# Patient Record
Sex: MALE | Race: WHITE | NOT HISPANIC OR LATINO | URBAN - METROPOLITAN AREA
[De-identification: names, ages, dates, MRNs, and addresses within clinical notes are randomized per-mention and may not be internally consistent; named-entity substitution may affect disease eponyms.]

---

## 2018-05-25 ENCOUNTER — OUTPATIENT (OUTPATIENT)
Dept: OUTPATIENT SERVICES | Facility: HOSPITAL | Age: 73
LOS: 1 days | Discharge: HOME | End: 2018-05-25

## 2018-05-29 DIAGNOSIS — R89.7 ABNORMAL HISTOLOGICAL FINDINGS IN SPECIMENS FROM OTHER ORGANS, SYSTEMS AND TISSUES: ICD-10-CM

## 2021-01-24 ENCOUNTER — INPATIENT (INPATIENT)
Facility: HOSPITAL | Age: 76
LOS: 0 days | Discharge: HOME | End: 2021-01-25
Attending: INTERNAL MEDICINE | Admitting: INTERNAL MEDICINE
Payer: MEDICARE

## 2021-01-24 ENCOUNTER — EMERGENCY (EMERGENCY)
Facility: HOSPITAL | Age: 76
LOS: 0 days | Discharge: HOME | End: 2021-01-24
Admitting: INTERNAL MEDICINE

## 2021-01-24 VITALS
SYSTOLIC BLOOD PRESSURE: 139 MMHG | DIASTOLIC BLOOD PRESSURE: 75 MMHG | HEART RATE: 140 BPM | RESPIRATION RATE: 18 BRPM | OXYGEN SATURATION: 100 % | TEMPERATURE: 99 F

## 2021-01-24 DIAGNOSIS — R55 SYNCOPE AND COLLAPSE: ICD-10-CM

## 2021-01-24 DIAGNOSIS — E78.5 HYPERLIPIDEMIA, UNSPECIFIED: ICD-10-CM

## 2021-01-24 DIAGNOSIS — I10 ESSENTIAL (PRIMARY) HYPERTENSION: ICD-10-CM

## 2021-01-24 DIAGNOSIS — R09.02 HYPOXEMIA: ICD-10-CM

## 2021-01-24 LAB
ALBUMIN SERPL ELPH-MCNC: 4.4 G/DL — SIGNIFICANT CHANGE UP (ref 3.5–5.2)
ALP SERPL-CCNC: 112 U/L — SIGNIFICANT CHANGE UP (ref 30–115)
ALT FLD-CCNC: 13 U/L — SIGNIFICANT CHANGE UP (ref 0–41)
ANION GAP SERPL CALC-SCNC: 9 MMOL/L — SIGNIFICANT CHANGE UP (ref 7–14)
APTT BLD: 29.5 SEC — SIGNIFICANT CHANGE UP (ref 27–39.2)
AST SERPL-CCNC: 19 U/L — SIGNIFICANT CHANGE UP (ref 0–41)
BASOPHILS # BLD AUTO: 0.09 K/UL — SIGNIFICANT CHANGE UP (ref 0–0.2)
BASOPHILS NFR BLD AUTO: 1.1 % — HIGH (ref 0–1)
BILIRUB SERPL-MCNC: 0.6 MG/DL — SIGNIFICANT CHANGE UP (ref 0.2–1.2)
BUN SERPL-MCNC: 13 MG/DL — SIGNIFICANT CHANGE UP (ref 10–20)
CALCIUM SERPL-MCNC: 9 MG/DL — SIGNIFICANT CHANGE UP (ref 8.5–10.1)
CHLORIDE SERPL-SCNC: 102 MMOL/L — SIGNIFICANT CHANGE UP (ref 98–110)
CO2 SERPL-SCNC: 26 MMOL/L — SIGNIFICANT CHANGE UP (ref 17–32)
CREAT SERPL-MCNC: 1.2 MG/DL — SIGNIFICANT CHANGE UP (ref 0.7–1.5)
D DIMER BLD IA.RAPID-MCNC: 538 NG/ML DDU — HIGH (ref 0–230)
EOSINOPHIL # BLD AUTO: 0.22 K/UL — SIGNIFICANT CHANGE UP (ref 0–0.7)
EOSINOPHIL NFR BLD AUTO: 2.7 % — SIGNIFICANT CHANGE UP (ref 0–8)
GLUCOSE SERPL-MCNC: 123 MG/DL — HIGH (ref 70–99)
HCT VFR BLD CALC: 47.2 % — SIGNIFICANT CHANGE UP (ref 42–52)
HGB BLD-MCNC: 15.2 G/DL — SIGNIFICANT CHANGE UP (ref 14–18)
IMM GRANULOCYTES NFR BLD AUTO: 0.2 % — SIGNIFICANT CHANGE UP (ref 0.1–0.3)
INR BLD: 1.03 RATIO — SIGNIFICANT CHANGE UP (ref 0.65–1.3)
LYMPHOCYTES # BLD AUTO: 1.63 K/UL — SIGNIFICANT CHANGE UP (ref 1.2–3.4)
LYMPHOCYTES # BLD AUTO: 20 % — LOW (ref 20.5–51.1)
MCHC RBC-ENTMCNC: 31.4 PG — HIGH (ref 27–31)
MCHC RBC-ENTMCNC: 32.2 G/DL — SIGNIFICANT CHANGE UP (ref 32–37)
MCV RBC AUTO: 97.5 FL — HIGH (ref 80–94)
MONOCYTES # BLD AUTO: 0.53 K/UL — SIGNIFICANT CHANGE UP (ref 0.1–0.6)
MONOCYTES NFR BLD AUTO: 6.5 % — SIGNIFICANT CHANGE UP (ref 1.7–9.3)
NEUTROPHILS # BLD AUTO: 5.65 K/UL — SIGNIFICANT CHANGE UP (ref 1.4–6.5)
NEUTROPHILS NFR BLD AUTO: 69.5 % — SIGNIFICANT CHANGE UP (ref 42.2–75.2)
NRBC # BLD: 0 /100 WBCS — SIGNIFICANT CHANGE UP (ref 0–0)
NT-PROBNP SERPL-SCNC: 99 PG/ML — SIGNIFICANT CHANGE UP (ref 0–300)
PLATELET # BLD AUTO: 272 K/UL — SIGNIFICANT CHANGE UP (ref 130–400)
POTASSIUM SERPL-MCNC: 3.9 MMOL/L — SIGNIFICANT CHANGE UP (ref 3.5–5)
POTASSIUM SERPL-SCNC: 3.9 MMOL/L — SIGNIFICANT CHANGE UP (ref 3.5–5)
PROT SERPL-MCNC: 7.4 G/DL — SIGNIFICANT CHANGE UP (ref 6–8)
PROTHROM AB SERPL-ACNC: 11.8 SEC — SIGNIFICANT CHANGE UP (ref 9.95–12.87)
RBC # BLD: 4.84 M/UL — SIGNIFICANT CHANGE UP (ref 4.7–6.1)
RBC # FLD: 12.5 % — SIGNIFICANT CHANGE UP (ref 11.5–14.5)
SARS-COV-2 RNA SPEC QL NAA+PROBE: SIGNIFICANT CHANGE UP
SODIUM SERPL-SCNC: 137 MMOL/L — SIGNIFICANT CHANGE UP (ref 135–146)
TROPONIN T SERPL-MCNC: <0.01 NG/ML — SIGNIFICANT CHANGE UP
WBC # BLD: 8.14 K/UL — SIGNIFICANT CHANGE UP (ref 4.8–10.8)
WBC # FLD AUTO: 8.14 K/UL — SIGNIFICANT CHANGE UP (ref 4.8–10.8)

## 2021-01-24 PROCEDURE — 71275 CT ANGIOGRAPHY CHEST: CPT | Mod: 26

## 2021-01-24 PROCEDURE — 71045 X-RAY EXAM CHEST 1 VIEW: CPT | Mod: 26

## 2021-01-24 PROCEDURE — 99285 EMERGENCY DEPT VISIT HI MDM: CPT

## 2021-01-24 PROCEDURE — 70450 CT HEAD/BRAIN W/O DYE: CPT | Mod: 26

## 2021-01-24 PROCEDURE — 99221 1ST HOSP IP/OBS SF/LOW 40: CPT | Mod: AI,GC

## 2021-01-24 PROCEDURE — 93010 ELECTROCARDIOGRAM REPORT: CPT

## 2021-01-24 RX ORDER — LOSARTAN POTASSIUM 100 MG/1
1 TABLET, FILM COATED ORAL
Qty: 0 | Refills: 0 | DISCHARGE

## 2021-01-24 RX ORDER — ATORVASTATIN CALCIUM 80 MG/1
1 TABLET, FILM COATED ORAL
Qty: 0 | Refills: 0 | DISCHARGE

## 2021-01-24 RX ORDER — ENOXAPARIN SODIUM 100 MG/ML
40 INJECTION SUBCUTANEOUS AT BEDTIME
Refills: 0 | Status: DISCONTINUED | OUTPATIENT
Start: 2021-01-24 | End: 2021-01-25

## 2021-01-24 RX ORDER — CHLORHEXIDINE GLUCONATE 213 G/1000ML
1 SOLUTION TOPICAL
Refills: 0 | Status: DISCONTINUED | OUTPATIENT
Start: 2021-01-24 | End: 2021-01-25

## 2021-01-24 RX ORDER — LOSARTAN POTASSIUM 100 MG/1
25 TABLET, FILM COATED ORAL DAILY
Refills: 0 | Status: DISCONTINUED | OUTPATIENT
Start: 2021-01-24 | End: 2021-01-25

## 2021-01-24 RX ORDER — ATORVASTATIN CALCIUM 80 MG/1
10 TABLET, FILM COATED ORAL DAILY
Refills: 0 | Status: DISCONTINUED | OUTPATIENT
Start: 2021-01-24 | End: 2021-01-25

## 2021-01-24 NOTE — ED PROVIDER NOTE - PHYSICAL EXAMINATION
CONSTITUTIONAL: Well-appearing; well-nourished; in no apparent distress.   EYES: PERRL; EOM intact.   ENT: normal nose; no rhinorrhea; normal pharynx with no tonsillar hypertrophy.   NECK: Supple; non-tender; no cervical lymphadenopathy.   CARDIOVASCULAR: Normal S1, S2; no murmurs, rubs, or gallops.   RESPIRATORY: +O2 88 % on RA at rest. No tachypnea or retractions. Pt speaking full sentences. Normal chest excursion with respiration; breath sounds clear and equal bilaterally; no wheezes, rhonchi, or rales.  GI/: Normal bowel sounds; non-distended; non-tender; no palpable organomegaly.   MS: No evidence of trauma or deformity. Normal ROM in all four extremities; non-tender to palpation; distal pulses are normal.   SKIN: Normal for age and race; warm; dry; good turgor; no apparent lesions or exudate.   Extrem: no peripheral edema. no calf ttp  NEURO/PSYCH: A & O x 4; grossly unremarkable. mood and manner are appropriate. Grooming and personal hygiene are appropriate. No apparent thoughts of harm to self or others.

## 2021-01-24 NOTE — H&P ADULT - NSHPPHYSICALEXAM_GEN_ALL_CORE
PHYSICAL EXAM:  GENERAL: NAD, lying in bed comfortably  HEAD:  Atraumatic, Normocephalic  EYES: EOMI, PERRLA, conjunctiva and sclera clear  ENT: Moist mucous membranes  NECK: Supple, No JVD  CHEST/LUNG: Clear to auscultation bilaterally; No rales, rhonchi, wheezing, or rubs. Unlabored respirations; patient desaturates when standing up and walking around  HEART: Tachycardic, normal rhythm; No murmurs, rubs, or gallops  ABDOMEN: Bowel sounds present; Soft, Nontender, Nondistended. No hepatomegaly  EXTREMITIES:  2+ Peripheral Pulses, brisk capillary refill. No clubbing, cyanosis, or edema  NERVOUS SYSTEM:  Alert & Oriented X3, speech clear. No deficits   MSK: FROM all 4 extremities, full and equal strength  SKIN: No rashes or lesions

## 2021-01-24 NOTE — H&P ADULT - ASSESSMENT
75 Y M with pmh of HTN, HLD presents after syncopal event this afternoon. Patient had urinary incontinence during event but no jerking movements or tongue biting, but was not confused after syncopal event.     #Syncope- possibly orthostatic due to decreased po intake, cannot rule out cardiac or seizure   -Perform orthostatics   -Trend cardiac enzymes  -Repeat EKG  -Obtain TSH  -Obtain Echo  -Neuro consult  -Fall precautions  -check a1c    #Desaturation to 80's while ambulating  -unsure of source; patient's lungs were clear on examination  -cxr negative  -PE study negative   -supplemental O2 prn  -ECHO     #HTN  -continue losartan 25mg po qd    #HLD  -continue lipitor 10mg po qd  -check lipid profile    #DVT PPx- LVX 40 sq qhs  #GI PPx- None  #Diet- DASH/TLC  #CHG  #Activity- AAT  #Dispo- Home  #Code- FULL       75 Y M with pmh of HTN, HLD presents after syncopal event this afternoon. Patient had urinary incontinence during event but no jerking movements or tongue biting, but was not confused after syncopal event.     #Syncope- possibly orthostatic due to decreased po intake, cannot rule out cardiac or seizure   -Perform orthostatics   -Trend cardiac enzymes  -Repeat EKG  -Obtain TSH  -Obtain Echo  -Neuro consult  -Fall precautions  -check a1c  -REEG    #Desaturation to 80's while ambulating  -unsure of source; patient's lungs were clear on examination  -cxr negative  -PE study negative   -supplemental O2 prn  -ECHO     #HTN  -continue losartan 25mg po qd    #HLD  -continue lipitor 10mg po qd  -check lipid profile    #DVT PPx- LVX 40 sq qhs  #GI PPx- None  #Diet- DASH/TLC  #CHG  #Activity- AAT  #Dispo- Home  #Code- FULL

## 2021-01-24 NOTE — H&P ADULT - ATTENDING COMMENTS
HPI:  75 Y M with pmh of HTN, HLD presents after syncopal event this afternoon. Per patient, he was shopping in grocery store, began feeling very warm, dizzy (described as lightheadedness), and started to have a headache (band-like). This occurred around 3PM. He was standing on line when this happened, and then passed out. He says that he did not recall any palpitations or shortness of breath, but did urinate himself when he was down, which he approximates was about a minute. He was not confused when he woke up, and witnesses did not report any jerking motions. He did not bite his tongue. This has never happened before. Of note, he says he had not eaten since 5PM the previous night. He is active, walks many blocks each day. Denies any recent weight loss. He denied any chest pain, cough, fevers, chills, recent illnesses, diarrhea, nausea, vomiting, constipation, abdominal pain, recent travel, sick contacts.    ED Course:  T 98.7F, P 140, /75, R 18, S 100% on RA at rest. He was observed to desaturate to 80's when moving. CTA chest was negative for PE. CTH negative.  (24 Jan 2021 22:29)    REVIEW OF SYSTEMS: see cc/HPI   CONSTITUTIONAL: No weakness, fevers or chills, (+) loss of consciousness   EYES/ENT: No visual changes;  No vertigo or throat pain   NECK: No pain or stiffness  RESPIRATORY: No cough, wheezing, hemoptysis; No shortness of breath  CARDIOVASCULAR: No chest pain or palpitations  GASTROINTESTINAL: No abdominal or epigastric pain. No nausea, vomiting, or hematemesis; No diarrhea or constipation. No melena or hematochezia.  GENITOURINARY: No dysuria, frequency or hematuria  NEUROLOGICAL: No numbness or weakness  SKIN: No itching, rashes    Physical Exam:   General: WN/WD NAD  Neurology: A&Ox3, nonfocal, follows commands  Eyes: PERRLA/ EOMI  ENT/Neck: Neck supple, trachea midline, No JVD  Respiratory: CTA B/L, No wheezing, rales, rhonchi  CV: Normal rate regular rhythm, S1S2, no murmurs, rubs or gallops  Abdominal: Soft, NT, ND +BS,   Extremities: No edema, + peripheral pulses  Skin: No Rashes, Hematoma, Ecchymosis  Incisions: N/A  Tubes: n/a    A/p  Syncope r/o arrhythmia r/o orthostatic hypotension   -admit to tele   -serial EKG and Yady  -2D echo   -Cardiology eval   -REEG   -orthostatic vital     Above note hypoxia NOT duplicated and to the contrary after replacing the sensor patient was 99 % on RA  - check pulse ox q shift    HTN - stable   -c/w losartan    Dyslipidemia   -c/w statin     DVT prophylaxis

## 2021-01-24 NOTE — ED ADULT NURSE NOTE - CHIEF COMPLAINT QUOTE
patient with hx of HTN biba for syncopal episode at [facility]. states he felt dizzy and fell on his backside. denies any head injury patient denies any chest pain or palpations

## 2021-01-24 NOTE — ED PROVIDER NOTE - NS ED ROS FT
Constitutional: no fever, chills, no recent weight loss, change in appetite or malaise  Eyes: no redness/discharge/pain/vision changes  ENT: no rhinorrhea/ear pain/sore throat  Cardiac: No chest pain, SOB or edema.  Respiratory: No cough or respiratory distress  GI: No nausea, vomiting, diarrhea or abdominal pain.  : No dysuria, frequency, urgency or hematuria  MS: no pain to back or extremities, no loss of ROM, no weakness  Neuro: + syncope  Skin: No skin rash.  Endocrine: No history of thyroid disease or diabetes.  Except as documented in the HPI, all other systems are negative.

## 2021-01-24 NOTE — H&P ADULT - NSHPLABSRESULTS_GEN_ALL_CORE
CBC Full  -  ( 24 Jan 2021 17:36 )  WBC Count : 8.14 K/uL  RBC Count : 4.84 M/uL  Hemoglobin : 15.2 g/dL  Hematocrit : 47.2 %  Platelet Count - Automated : 272 K/uL  Mean Cell Volume : 97.5 fL  Mean Cell Hemoglobin : 31.4 pg  Mean Cell Hemoglobin Concentration : 32.2 g/dL  Auto Neutrophil # : 5.65 K/uL  Auto Lymphocyte # : 1.63 K/uL  Auto Monocyte # : 0.53 K/uL  Auto Eosinophil # : 0.22 K/uL  Auto Basophil # : 0.09 K/uL  Auto Neutrophil % : 69.5 %  Auto Lymphocyte % : 20.0 %  Auto Monocyte % : 6.5 %  Auto Eosinophil % : 2.7 %  Auto Basophil % : 1.1 %    01-24    137  |  102  |  13  ----------------------------<  123<H>  3.9   |  26  |  1.2    Ca    9.0      24 Jan 2021 17:36    TPro  7.4  /  Alb  4.4  /  TBili  0.6  /  DBili  x   /  AST  19  /  ALT  13  /  AlkPhos  112  01-24    D-Dimer Assay, Quantitative: 538: Manufacturers recommended Cut off for VTE is 230 ng/ml D-DU ng/mL DDU (01.24.21 @ 17:36)    Troponin T, Serum: <0.01 ng/mL (01.24.21 @ 17:36)    Serum Pro-Brain Natriuretic Peptide: 99 pg/mL (01.24.21 @ 17:36)    < from: CT Angio Chest PE Protocol w/ IV Cont (01.24.21 @ 19:44) >    IMPRESSION:      No CT evidence of acute intrathoracic pathology.    No evidence of a pulmonary embolus.    Cholelithiasis    < end of copied text >    < from: CT Head No Cont (01.24.21 @ 19:40) >      IMPRESSION:  No acute intracranial pathology. No evidence of midline shift, mass effect or intracranial hemorrhage.      < end of copied text >    < from: Xray Chest 1 View-PORTABLE IMMEDIATE (Xray Chest 1 View-PORTABLE IMMEDIATE .) (01.24.21 @ 18:00) >    Impression:    No radiographic evidence of acute cardiopulmonary disease.    < end of copied text >

## 2021-01-24 NOTE — ED PROVIDER NOTE - OBJECTIVE STATEMENT
75 year old M with hx of HTM, HLD biba s/p syncope. Pt sts was waiting in line in grocery store when started to feel hot/lightheaded and had witnessed episode of syncope. Pt sts remembers waking up on floor and currently denies any symptoms. Pt notes did not have anything to eat/drink today. Pt otherwise denies any hx of syncope, recent illness/fever/chills/cough, chest pain, sob, abd pain, nausea, vomiting, leg pain/swelling, hx of DVT/PE, recent travel/sxs/immobilizations, smoking hx, hormone use, ca hx.

## 2021-01-24 NOTE — ED PROVIDER NOTE - CLINICAL SUMMARY MEDICAL DECISION MAKING FREE TEXT BOX
75 yr old m that presents s/p syncope. labs, ekg, imaging obtained. pt noted to be hypoxic. will admit to medicine.

## 2021-01-24 NOTE — ED PROVIDER NOTE - ATTENDING CONTRIBUTION TO CARE
75 yr old m w/ a pmh significant for htn who presents s/p syncope. Pt states that he was at the supermarket when he felt hot and synopsized, pt states that he felt warm before the event but had no symptoms after. Pt felt on the floor, however, denies hitting his head. Pt denies any sob, chest pain, nausea, vomiting, fevers, chills, headaches or any other complaints.     VITAL SIGNS: I have reviewed nursing notes and confirm.  CONSTITUTIONAL: non-toxic, well appearing  SKIN: no rash, no petechiae.  EYES: PERRL, EOMI, pink conjunctiva, anicteric  ENT: tongue midline, no exudates, MMM  NECK: Supple; no meningismus, no JVD  CARD: RRR, no murmurs, equal radial pulses bilaterally 2+  RESP: CTAB, during exam, and while resting on the stretcher, pt noted to be 88% on room air, not in respiratory distress.   ABD: Soft, non-tender, non-distended, no peritoneal signs, no HSM, no CVA tenderness  EXT: Normal ROM x4. No edema. No calves tenderness  NEURO: Alert, oriented. CN2-12 intact, equal strength bilaterally, nl gait.  PSYCH: Cooperative, appropriate.    a/p  75 yr old m that presents s/p syncope  -labs  -imaging  -ekg  -dispo pending

## 2021-01-24 NOTE — ED ADULT TRIAGE NOTE - CHIEF COMPLAINT QUOTE
patient with hx of HTN biba for syncopal episode at [facility]. states he felt dizzy and fell on his backside. denies any head injury patient denies any chest pain or palpations

## 2021-01-25 VITALS — SYSTOLIC BLOOD PRESSURE: 136 MMHG | HEART RATE: 98 BPM | DIASTOLIC BLOOD PRESSURE: 98 MMHG

## 2021-01-25 LAB
A1C WITH ESTIMATED AVERAGE GLUCOSE RESULT: 6 % — HIGH (ref 4–5.6)
ALBUMIN SERPL ELPH-MCNC: 4.3 G/DL — SIGNIFICANT CHANGE UP (ref 3.5–5.2)
ALP SERPL-CCNC: 104 U/L — SIGNIFICANT CHANGE UP (ref 30–115)
ALT FLD-CCNC: 13 U/L — SIGNIFICANT CHANGE UP (ref 0–41)
ANION GAP SERPL CALC-SCNC: 11 MMOL/L — SIGNIFICANT CHANGE UP (ref 7–14)
AST SERPL-CCNC: 18 U/L — SIGNIFICANT CHANGE UP (ref 0–41)
BASOPHILS # BLD AUTO: 0.07 K/UL — SIGNIFICANT CHANGE UP (ref 0–0.2)
BASOPHILS NFR BLD AUTO: 0.7 % — SIGNIFICANT CHANGE UP (ref 0–1)
BILIRUB SERPL-MCNC: 0.7 MG/DL — SIGNIFICANT CHANGE UP (ref 0.2–1.2)
BUN SERPL-MCNC: 21 MG/DL — HIGH (ref 10–20)
CALCIUM SERPL-MCNC: 9.2 MG/DL — SIGNIFICANT CHANGE UP (ref 8.5–10.1)
CHLORIDE SERPL-SCNC: 104 MMOL/L — SIGNIFICANT CHANGE UP (ref 98–110)
CO2 SERPL-SCNC: 25 MMOL/L — SIGNIFICANT CHANGE UP (ref 17–32)
CREAT SERPL-MCNC: 1.3 MG/DL — SIGNIFICANT CHANGE UP (ref 0.7–1.5)
EOSINOPHIL # BLD AUTO: 0.14 K/UL — SIGNIFICANT CHANGE UP (ref 0–0.7)
EOSINOPHIL NFR BLD AUTO: 1.4 % — SIGNIFICANT CHANGE UP (ref 0–8)
ESTIMATED AVERAGE GLUCOSE: 126 MG/DL — HIGH (ref 68–114)
GLUCOSE SERPL-MCNC: 92 MG/DL — SIGNIFICANT CHANGE UP (ref 70–99)
HCT VFR BLD CALC: 44.1 % — SIGNIFICANT CHANGE UP (ref 42–52)
HGB BLD-MCNC: 14.5 G/DL — SIGNIFICANT CHANGE UP (ref 14–18)
IMM GRANULOCYTES NFR BLD AUTO: 0.4 % — HIGH (ref 0.1–0.3)
LYMPHOCYTES # BLD AUTO: 2.15 K/UL — SIGNIFICANT CHANGE UP (ref 1.2–3.4)
LYMPHOCYTES # BLD AUTO: 21.6 % — SIGNIFICANT CHANGE UP (ref 20.5–51.1)
MAGNESIUM SERPL-MCNC: 2.2 MG/DL — SIGNIFICANT CHANGE UP (ref 1.8–2.4)
MCHC RBC-ENTMCNC: 32.3 PG — HIGH (ref 27–31)
MCHC RBC-ENTMCNC: 32.9 G/DL — SIGNIFICANT CHANGE UP (ref 32–37)
MCV RBC AUTO: 98.2 FL — HIGH (ref 80–94)
MONOCYTES # BLD AUTO: 1.01 K/UL — HIGH (ref 0.1–0.6)
MONOCYTES NFR BLD AUTO: 10.1 % — HIGH (ref 1.7–9.3)
NEUTROPHILS # BLD AUTO: 6.56 K/UL — HIGH (ref 1.4–6.5)
NEUTROPHILS NFR BLD AUTO: 65.8 % — SIGNIFICANT CHANGE UP (ref 42.2–75.2)
NRBC # BLD: 0 /100 WBCS — SIGNIFICANT CHANGE UP (ref 0–0)
PLATELET # BLD AUTO: 266 K/UL — SIGNIFICANT CHANGE UP (ref 130–400)
POTASSIUM SERPL-MCNC: 5.3 MMOL/L — HIGH (ref 3.5–5)
POTASSIUM SERPL-SCNC: 5.3 MMOL/L — HIGH (ref 3.5–5)
PROT SERPL-MCNC: 7.3 G/DL — SIGNIFICANT CHANGE UP (ref 6–8)
RBC # BLD: 4.49 M/UL — LOW (ref 4.7–6.1)
RBC # FLD: 13 % — SIGNIFICANT CHANGE UP (ref 11.5–14.5)
SODIUM SERPL-SCNC: 140 MMOL/L — SIGNIFICANT CHANGE UP (ref 135–146)
TROPONIN T SERPL-MCNC: <0.01 NG/ML — SIGNIFICANT CHANGE UP
TSH SERPL-MCNC: 2.35 UIU/ML — SIGNIFICANT CHANGE UP (ref 0.27–4.2)
WBC # BLD: 9.97 K/UL — SIGNIFICANT CHANGE UP (ref 4.8–10.8)
WBC # FLD AUTO: 9.97 K/UL — SIGNIFICANT CHANGE UP (ref 4.8–10.8)

## 2021-01-25 PROCEDURE — 99239 HOSP IP/OBS DSCHRG MGMT >30: CPT

## 2021-01-25 PROCEDURE — 99221 1ST HOSP IP/OBS SF/LOW 40: CPT

## 2021-01-25 PROCEDURE — 93010 ELECTROCARDIOGRAM REPORT: CPT

## 2021-01-25 RX ADMIN — LOSARTAN POTASSIUM 25 MILLIGRAM(S): 100 TABLET, FILM COATED ORAL at 06:54

## 2021-01-25 NOTE — DISCHARGE NOTE PROVIDER - CARE PROVIDER_API CALL
Freddie Stark [address] [address]  Phone: [phone]  Fax: [phone]  Follow Up Time: 1 week

## 2021-01-25 NOTE — PROGRESS NOTE ADULT - ASSESSMENT
75M PMHx HTN, HLD here with syncope.    #Syncope, occurred while waiting in line at grocery store  slouched over, no head trauma, +prodrome of lightheadedness, flashes of light  no post ictal state; was told he was down for <1 min  no po intake for 24 hours prior  orthostatics neg  ekg unremarkable  since resolved, no cp, sob  suspect reflex mediated syncope  pt requesting to be d/c prior to tte, will recommend f/u outpt  needs outpt pmd f/u one week  #HTN  losartan 25  #HLD  lipitor 10  #DVT ppx  d/c today

## 2021-01-25 NOTE — DISCHARGE NOTE PROVIDER - HOSPITAL COURSE
75 Y M with pmh of HTN, HLD presented to the ED after syncopal event. episode of syncope likely vasovagal since patient didn't eat for 24 hours before the event and admits to feeling hot before the episode of syncope.  No further inpatient workup.  Patient advised to follow up with his PCP after discharge 75 Y M with pmh of HTN, HLD presented to the ED after syncopal event. episode of syncope likely vasovagal since patient didn't eat for 24 hours before the event and admits to feeling hot before the episode of syncope.  No further inpatient workup.  Patient advised to follow up with his PCP after discharge     41 minutes spent on discharge planning.

## 2021-01-25 NOTE — DISCHARGE NOTE PROVIDER - NSDCMRMEDTOKEN_GEN_ALL_CORE_FT
Lipitor 10 mg oral tablet: 1 tab(s) orally once a day  losartan 25 mg oral tablet: 1 tab(s) orally once a day

## 2021-01-25 NOTE — CONSULT NOTE ADULT - SUBJECTIVE AND OBJECTIVE BOX
CC: Syncope        HPI:  74 Yo RHM  with pmh of HTN, HLD presented s/p a syncopal episode. Per patient, he walked to the grocery near his house and was overly dressed with his warm clothes, felt hot and lightheaded when he entered the store but took off his mask a took a few breaths and felt better. He was then waiting in the line to pay for his groceries when he again started feeling lightheaded and the next thing he remembers is people surrounding him trying to wake him up. He states that he was baseline immediately and was walking instantly. By standers insisted him to come to ED and called EMS. He did urinate on himself when he was down. The loc lasted for 30-40 sec duration and no seizure like activity was noted. Denies similar episodes before this. Of note, he says he had not eaten since 24 hours since he was not feeling up to it and attributes the symptoms to that.  He is active, walks many blocks each day.         ED Course:  T 98.7F, P 140, /75, R 18, S 100% on RA at rest. He was observed to desaturate to 80's when moving.   CTA chest was negative for PE.   CTH negative.        Home medications  -Amlodipine 10 mg q 24  -Lipitor 10 mg q 24        Social history  Denies h/o smoking alcohol or drug use      Neuro Exam:  Orientation: A/o x 4, speech fluent. Normal attention and comprehension  Cranial Nerves: visual acuity intact bilaterally, visual fields full to confrontation, pupils equal round and reactive to light, extraocular motion intact, facial sensation intact symmetrically, face symmetrical, hearing was intact bilaterally, tongue and palate midline, head turning and shoulder shrug symmetric and there was no tongue deviation with protrusion.   Motor: 5/5 throughout/ no drift  Sensory exam: Intact and symmetric  Coordination:. no dysmetria or limb ataxia  Gait: steady          NIHSS:     Allergies    No Known Allergies    Intolerances      MEDICATIONS  (STANDING):  atorvastatin Oral Tab/Cap - Peds 10 milliGRAM(s) Oral daily  chlorhexidine 4% Liquid 1 Application(s) Topical <User Schedule>  enoxaparin Injectable 40 milliGRAM(s) SubCutaneous at bedtime  losartan 25 milliGRAM(s) Oral daily        MEDICATIONS  (PRN):      LABS:                        15.2   8.14  )-----------( 272      ( 24 Jan 2021 17:36 )             47.2     01-24    137  |  102  |  13  ----------------------------<  123<H>  3.9   |  26  |  1.2    Ca    9.0      24 Jan 2021 17:36    TPro  7.4  /  Alb  4.4  /  TBili  0.6  /  DBili  x   /  AST  19  /  ALT  13  /  AlkPhos  112  01-24    PT/INR - ( 24 Jan 2021 17:36 )   PT: 11.80 sec;   INR: 1.03 ratio         PTT - ( 24 Jan 2021 17:36 )  PTT:29.5 sec        Neuro Imaging:  < from: CT Head No Cont (01.24.21 @ 19:40) >  FINDINGS:    The ventricles and sulci are unremarkable in appearance.    There is periventricular white matter hypodensity. There is no intraparenchymal hematoma, mass effect or midline shift. No abnormal extra-axial fluid collections are present.    The calvarium is intact. There is mild paranasal sinus mucosal thickening. Tympanomastoid cavities are unremarkable. Globes are unremarkable.    IMPRESSION:  No acute intracranial pathology. No evidence of midline shift, mass effect or intracranial hemorrhage.      ERICK KIMBALL M.D., ATTENDING RADIOLOGIST  This document has been electronically signed. Jan 24 2021  7:44PM    < end of copied text >    EEG:     Echo:   Carotid Doppler: N/A  Telemetry:

## 2021-01-25 NOTE — DISCHARGE NOTE PROVIDER - INSTRUCTIONS
A combination diet rich in fruits, vegetables, legumes, and low-fat dairy products and low in snacks, sweets, meats, and saturated and total fat (this combination diet is called the "DASH diet"). The DASH diet is comprised of four to five servings of fruit, four to five servings of vegetables, two to three servings of low-fat dairy per day, and <25 percent fat

## 2021-01-25 NOTE — DISCHARGE NOTE PROVIDER - NSDCCPCAREPLAN_GEN_ALL_CORE_FT
PRINCIPAL DISCHARGE DIAGNOSIS  Diagnosis: Syncope  Assessment and Plan of Treatment: You came the hospital after an episode of syncope. Workup was negative for any serious etiology. Please follow up with your PCP as directed  If you experience symptoms again please call 911 or go to the nearest emergency department

## 2021-01-25 NOTE — CONSULT NOTE ADULT - ASSESSMENT
76 Yo RHM  with pmh of HTN, HLD presented s/p a syncopal episode which was preceded by lightheadedness and followed by urinary incontinence without a post ictal state. Patient reports that he had urgency to urinate prior to the episode bu he was waiting to reach home. ore this. Of note, he says he had not eaten for 24 hours prior to this episode since he was not feeling up to it and attributes the symptoms to that.  He is active, walks many blocks each day.         Vasovagal syncope    76 Yo RHM  with pmh of HTN, HLD presented s/p a syncopal episode which was preceded by lightheadedness and followed by urinary incontinence without a post ictal state. Patient reports that he had urgency to urinate prior to the episode bu he was waiting to reach home. ore this. Of note, he says he had not eaten for 24 hours prior to this episode since he was not feeling up to it and attributes the symptoms to that.  He is active, walks many blocks each day.         Vasovagal syncope less likely seizure    Neuro attending note will follow

## 2021-01-25 NOTE — CONSULT NOTE ADULT - ATTENDING COMMENTS
I have personally seen and examined this patient.  I have fully participated in the care of this patient.  I have reviewed all pertinent clinical information, including history, physical exam, plan and note. Patient presented with one episode of brief LOC with no seizure like activity. Reported using ice to cool down himself which is the reason that his pants was wet. Currently alert and oriented and exam is normal. Most likely syncope or vasovagal. CT head normal. No further work up per neurology standpoint.  I have reviewed all pertinent clinical information and reviewed all relevant imaging and diagnostic studies personally.  Recommendations as above.  Agree with above assessment except as noted.

## 2021-01-25 NOTE — DISCHARGE NOTE NURSING/CASE MANAGEMENT/SOCIAL WORK - PATIENT PORTAL LINK FT
You can access the FollowMyHealth Patient Portal offered by [facility] by registering at the following website: [email or web address]. By joining [facility]’s FollowMyHealth portal, you will also be able to view your health information using other applications (apps) compatible with our system.

## 2021-01-25 NOTE — PROGRESS NOTE ADULT - SUBJECTIVE AND OBJECTIVE BOX
INTERVAL HPI/OVERNIGHT EVENTS:    SUBJECTIVE: Patient seen and examined at bedside.     no cp, sob, abd pain, fever  no ha, syncope, lightheadedness dizziness    OBJECTIVE:    VITAL SIGNS:  Vital Signs Last 24 Hrs  T(C): 36.3 (25 Jan 2021 07:48), Max: 37.1 (24 Jan 2021 16:41)  T(F): 97.4 (25 Jan 2021 07:48), Max: 98.7 (24 Jan 2021 16:41)  HR: 79 (25 Jan 2021 07:48) (62 - 140)  BP: 132/84 (25 Jan 2021 07:48) (132/84 - 139/75)  BP(mean): --  RR: 18 (25 Jan 2021 07:48) (16 - 18)  SpO2: 99% (25 Jan 2021 07:48) (99% - 100%)      PHYSICAL EXAM:    General: NAD  HEENT: NC/AT; PERRL, clear conjunctiva  Neck: supple  Respiratory: CTA b/l  Cardiovascular: +S1/S2; RRR  Abdomen: soft, NT/ND; +BS x4  Extremities: WWP, 2+ peripheral pulses b/l; no LE edema  Skin: normal color and turgor; no rash  Neurological:    MEDICATIONS:  MEDICATIONS  (STANDING):  atorvastatin Oral Tab/Cap - Peds 10 milliGRAM(s) Oral daily  chlorhexidine 4% Liquid 1 Application(s) Topical <User Schedule>  enoxaparin Injectable 40 milliGRAM(s) SubCutaneous at bedtime  losartan 25 milliGRAM(s) Oral daily    MEDICATIONS  (PRN):      ALLERGIES:  Allergies    No Known Allergies    Intolerances        LABS:                        14.5   9.97  )-----------( 266      ( 25 Jan 2021 05:21 )             44.1     Hemoglobin: 14.5 g/dL (01-25 @ 05:21)  Hemoglobin: 15.2 g/dL (01-24 @ 17:36)    CBC Full  -  ( 25 Jan 2021 05:21 )  WBC Count : 9.97 K/uL  RBC Count : 4.49 M/uL  Hemoglobin : 14.5 g/dL  Hematocrit : 44.1 %  Platelet Count - Automated : 266 K/uL  Mean Cell Volume : 98.2 fL  Mean Cell Hemoglobin : 32.3 pg  Mean Cell Hemoglobin Concentration : 32.9 g/dL  Auto Neutrophil # : 6.56 K/uL  Auto Lymphocyte # : 2.15 K/uL  Auto Monocyte # : 1.01 K/uL  Auto Eosinophil # : 0.14 K/uL  Auto Basophil # : 0.07 K/uL  Auto Neutrophil % : 65.8 %  Auto Lymphocyte % : 21.6 %  Auto Monocyte % : 10.1 %  Auto Eosinophil % : 1.4 %  Auto Basophil % : 0.7 %    01-25    140  |  104  |  21<H>  ----------------------------<  92  5.3<H>   |  25  |  1.3    Ca    9.2      25 Jan 2021 05:21  Mg     2.2     01-25    TPro  7.3  /  Alb  4.3  /  TBili  0.7  /  DBili  x   /  AST  18  /  ALT  13  /  AlkPhos  104  01-25    Creatinine Trend: 1.3<--, 1.2<--  LIVER FUNCTIONS - ( 25 Jan 2021 05:21 )  Alb: 4.3 g/dL / Pro: 7.3 g/dL / ALK PHOS: 104 U/L / ALT: 13 U/L / AST: 18 U/L / GGT: x           PT/INR - ( 24 Jan 2021 17:36 )   PT: 11.80 sec;   INR: 1.03 ratio         PTT - ( 24 Jan 2021 17:36 )  PTT:29.5 sec    hs Troponin:              CSF:                      EKG:   MICROBIOLOGY:    IMAGING:      Labs, imaging, EKG personally reviewed    RADIOLOGY & ADDITIONAL TESTS: Reviewed.

## 2021-01-29 DIAGNOSIS — E78.5 HYPERLIPIDEMIA, UNSPECIFIED: ICD-10-CM

## 2021-01-29 DIAGNOSIS — I10 ESSENTIAL (PRIMARY) HYPERTENSION: ICD-10-CM

## 2021-01-29 DIAGNOSIS — R55 SYNCOPE AND COLLAPSE: ICD-10-CM
